# Patient Record
Sex: FEMALE | Race: OTHER | NOT HISPANIC OR LATINO | Employment: FULL TIME | URBAN - NONMETROPOLITAN AREA
[De-identification: names, ages, dates, MRNs, and addresses within clinical notes are randomized per-mention and may not be internally consistent; named-entity substitution may affect disease eponyms.]

---

## 2022-04-01 ENCOUNTER — OFFICE VISIT (OUTPATIENT)
Dept: PRIMARY CARE CLINIC | Facility: CLINIC | Age: 42
End: 2022-04-01

## 2022-04-01 ENCOUNTER — LAB VISIT (OUTPATIENT)
Dept: LAB | Facility: HOSPITAL | Age: 42
End: 2022-04-01
Attending: STUDENT IN AN ORGANIZED HEALTH CARE EDUCATION/TRAINING PROGRAM

## 2022-04-01 VITALS
BODY MASS INDEX: 22.98 KG/M2 | HEART RATE: 81 BPM | OXYGEN SATURATION: 99 % | HEIGHT: 60 IN | DIASTOLIC BLOOD PRESSURE: 56 MMHG | TEMPERATURE: 98 F | SYSTOLIC BLOOD PRESSURE: 117 MMHG | WEIGHT: 117.06 LBS

## 2022-04-01 DIAGNOSIS — R53.83 FATIGUE, UNSPECIFIED TYPE: ICD-10-CM

## 2022-04-01 DIAGNOSIS — Z13.6 ENCOUNTER FOR LIPID SCREENING FOR CARDIOVASCULAR DISEASE: ICD-10-CM

## 2022-04-01 DIAGNOSIS — R53.83 FATIGUE, UNSPECIFIED TYPE: Primary | ICD-10-CM

## 2022-04-01 DIAGNOSIS — R13.10 DYSPHAGIA, UNSPECIFIED TYPE: ICD-10-CM

## 2022-04-01 DIAGNOSIS — Z13.220 ENCOUNTER FOR LIPID SCREENING FOR CARDIOVASCULAR DISEASE: ICD-10-CM

## 2022-04-01 LAB
ALBUMIN SERPL BCP-MCNC: 2.9 G/DL (ref 3.5–5.2)
ALP SERPL-CCNC: 38 U/L (ref 55–135)
ALT SERPL W/O P-5'-P-CCNC: 14 U/L (ref 10–44)
ANION GAP SERPL CALC-SCNC: 4 MMOL/L (ref 8–16)
AST SERPL-CCNC: 10 U/L (ref 10–40)
BASOPHILS # BLD AUTO: 0.05 K/UL (ref 0–0.2)
BASOPHILS NFR BLD: 0.6 % (ref 0–1.9)
BILIRUB SERPL-MCNC: 0.1 MG/DL (ref 0.1–1)
BUN SERPL-MCNC: 12 MG/DL (ref 6–20)
CALCIUM SERPL-MCNC: 8.3 MG/DL (ref 8.7–10.5)
CHLORIDE SERPL-SCNC: 106 MMOL/L (ref 95–110)
CHOLEST SERPL-MCNC: 264 MG/DL (ref 120–199)
CHOLEST/HDLC SERPL: 2.9 {RATIO} (ref 2–5)
CO2 SERPL-SCNC: 27 MMOL/L (ref 23–29)
CREAT SERPL-MCNC: 0.6 MG/DL (ref 0.5–1.4)
DIFFERENTIAL METHOD: NORMAL
EOSINOPHIL # BLD AUTO: 0.2 K/UL (ref 0–0.5)
EOSINOPHIL NFR BLD: 2.9 % (ref 0–8)
ERYTHROCYTE [DISTWIDTH] IN BLOOD BY AUTOMATED COUNT: 12.7 % (ref 11.5–14.5)
EST. GFR  (AFRICAN AMERICAN): >60 ML/MIN/1.73 M^2
EST. GFR  (NON AFRICAN AMERICAN): >60 ML/MIN/1.73 M^2
GLUCOSE SERPL-MCNC: 102 MG/DL (ref 70–110)
HCT VFR BLD AUTO: 37.2 % (ref 37–48.5)
HDLC SERPL-MCNC: 92 MG/DL (ref 40–75)
HDLC SERPL: 34.8 % (ref 20–50)
HGB BLD-MCNC: 12.5 G/DL (ref 12–16)
IMM GRANULOCYTES # BLD AUTO: 0.02 K/UL (ref 0–0.04)
IMM GRANULOCYTES NFR BLD AUTO: 0.2 % (ref 0–0.5)
LDLC SERPL CALC-MCNC: 132.6 MG/DL (ref 63–159)
LYMPHOCYTES # BLD AUTO: 3.4 K/UL (ref 1–4.8)
LYMPHOCYTES NFR BLD: 41.8 % (ref 18–48)
MCH RBC QN AUTO: 28.6 PG (ref 27–31)
MCHC RBC AUTO-ENTMCNC: 33.6 G/DL (ref 32–36)
MCV RBC AUTO: 85 FL (ref 82–98)
MONOCYTES # BLD AUTO: 0.7 K/UL (ref 0.3–1)
MONOCYTES NFR BLD: 8.2 % (ref 4–15)
NEUTROPHILS # BLD AUTO: 3.7 K/UL (ref 1.8–7.7)
NEUTROPHILS NFR BLD: 46.3 % (ref 38–73)
NONHDLC SERPL-MCNC: 172 MG/DL
NRBC BLD-RTO: 0 /100 WBC
PLATELET # BLD AUTO: 280 K/UL (ref 150–450)
PMV BLD AUTO: 9.8 FL (ref 9.2–12.9)
POTASSIUM SERPL-SCNC: 4.1 MMOL/L (ref 3.5–5.1)
PROT SERPL-MCNC: 7.3 G/DL (ref 6–8.4)
RBC # BLD AUTO: 4.37 M/UL (ref 4–5.4)
SODIUM SERPL-SCNC: 137 MMOL/L (ref 136–145)
TRIGL SERPL-MCNC: 197 MG/DL (ref 30–150)
TSH SERPL DL<=0.005 MIU/L-ACNC: 2.35 UIU/ML (ref 0.4–4)
WBC # BLD AUTO: 8.07 K/UL (ref 3.9–12.7)

## 2022-04-01 PROCEDURE — 85025 COMPLETE CBC W/AUTO DIFF WBC: CPT | Performed by: STUDENT IN AN ORGANIZED HEALTH CARE EDUCATION/TRAINING PROGRAM

## 2022-04-01 PROCEDURE — 36415 COLL VENOUS BLD VENIPUNCTURE: CPT | Performed by: STUDENT IN AN ORGANIZED HEALTH CARE EDUCATION/TRAINING PROGRAM

## 2022-04-01 PROCEDURE — 99203 PR OFFICE/OUTPT VISIT, NEW, LEVL III, 30-44 MIN: ICD-10-PCS | Mod: S$PBB,,, | Performed by: STUDENT IN AN ORGANIZED HEALTH CARE EDUCATION/TRAINING PROGRAM

## 2022-04-01 PROCEDURE — 99203 OFFICE O/P NEW LOW 30 MIN: CPT | Mod: S$PBB,,, | Performed by: STUDENT IN AN ORGANIZED HEALTH CARE EDUCATION/TRAINING PROGRAM

## 2022-04-01 PROCEDURE — 99203 OFFICE O/P NEW LOW 30 MIN: CPT | Mod: PBBFAC,PN | Performed by: STUDENT IN AN ORGANIZED HEALTH CARE EDUCATION/TRAINING PROGRAM

## 2022-04-01 PROCEDURE — 84443 ASSAY THYROID STIM HORMONE: CPT | Performed by: STUDENT IN AN ORGANIZED HEALTH CARE EDUCATION/TRAINING PROGRAM

## 2022-04-01 PROCEDURE — 99999 PR PBB SHADOW E&M-NEW PATIENT-LVL III: ICD-10-PCS | Mod: PBBFAC,,, | Performed by: STUDENT IN AN ORGANIZED HEALTH CARE EDUCATION/TRAINING PROGRAM

## 2022-04-01 PROCEDURE — 80053 COMPREHEN METABOLIC PANEL: CPT | Performed by: STUDENT IN AN ORGANIZED HEALTH CARE EDUCATION/TRAINING PROGRAM

## 2022-04-01 PROCEDURE — 99999 PR PBB SHADOW E&M-NEW PATIENT-LVL III: CPT | Mod: PBBFAC,,, | Performed by: STUDENT IN AN ORGANIZED HEALTH CARE EDUCATION/TRAINING PROGRAM

## 2022-04-01 PROCEDURE — 80061 LIPID PANEL: CPT | Performed by: STUDENT IN AN ORGANIZED HEALTH CARE EDUCATION/TRAINING PROGRAM

## 2022-04-02 NOTE — PROGRESS NOTES
[facility] Note    Chief Complaint      Chief Complaint   Patient presents with    [facility]    Sore Throat     Patient is having trouble swallowing feels like something is stuck inside her throat.       History of Present Illness      Marisabel Jo is a 41 y.o. female who presents today for [facility] and Sore Throat (Patient is having trouble swallowing feels like something is stuck inside her throat.)    The sensation of food stuck started about two weeks ago. She is still able to swallow solids and liquids. Denies nausea, vomiting, weakness, chest pain, palpitation, shortness of breath, fever, chills, excessive headache, dizziness, appetite change, weight loss or weight gain.  Patient denies family medical history of CAD, stroke.  No neurological deficits or tenderness.    Past Medical History:  History reviewed. No pertinent past medical history.    Past Surgical History:   has no past surgical history on file.    Family History:  family history is not on file.     Social History:  Social History     Tobacco Use    Smoking status: Never Smoker    Smokeless tobacco: Never Used   Substance Use Topics    Alcohol use: Never    Drug use: Never     I personally reviewed all past medical, surgical, social and family history.    Review of Systems   Constitutional: Negative for chills, fever, malaise/fatigue and weight loss.   HENT: Negative for congestion, ear discharge, ear pain, sinus pain and sore throat.    Eyes: Negative for blurred vision, pain, discharge and redness.   Respiratory: Negative for cough, hemoptysis, sputum production, shortness of breath, wheezing and stridor.    Cardiovascular: Negative for chest pain, palpitations and leg swelling.   Gastrointestinal: Negative for abdominal pain, blood in stool, constipation, diarrhea, nausea and vomiting.   Genitourinary: Negative for dysuria, frequency and hematuria.   Musculoskeletal: Negative for back pain, falls, joint pain,  myalgias and neck pain.   Skin: Negative.  Negative for rash.   Neurological: Negative for dizziness, tingling, focal weakness, seizures, weakness and headaches.   Endo/Heme/Allergies: Negative for environmental allergies and polydipsia. Does not bruise/bleed easily.   Psychiatric/Behavioral: Negative for depression and suicidal ideas. The patient is not nervous/anxious.         Medications:  No outpatient encounter medications on file as of 4/1/2022.     No facility-administered encounter medications on file as of 4/1/2022.       Allergies:  Review of patient's allergies indicates:  No Known Allergies    Health Maintenance:    There is no immunization history on file for this patient.   Health Maintenance   Topic Date Due    Hepatitis C Screening  Never done    TETANUS VACCINE  Never done    Lipid Panel  Completed     The patient has no Health Maintenance topics of status Not Due  Health Maintenance Due   Topic Date Due    Hepatitis C Screening  Never done    Cervical Cancer Screening  Never done    COVID-19 Vaccine (1) Never done    HIV Screening  Never done    TETANUS VACCINE  Never done    Influenza Vaccine (1) Never done     Physical Exam      Vital Signs  Temp: 97.7 °F (36.5 °C)  Temp src: Oral  Pulse: 81  SpO2: 99 %  BP: (!) 117/56  BP Location: Left arm  Patient Position: Sitting  Height and Weight  Height: 5' (152.4 cm)  Weight: 53.1 kg (117 lb 1 oz)  BSA (Calculated - sq m): 1.5 sq meters  BMI (Calculated): 22.9  Weight in (lb) to have BMI = 25: 127.7]    Physical Exam  Vitals reviewed.   Constitutional:       General: She is not in acute distress.     Appearance: Normal appearance. She is normal weight. She is not ill-appearing or toxic-appearing.   HENT:      Head: Normocephalic and atraumatic.      Right Ear: External ear normal.      Left Ear: External ear normal.      Nose: Nose normal.      Mouth/Throat:      Mouth: Mucous membranes are moist.      Pharynx: Oropharynx is clear.   Eyes:       Extraocular Movements: Extraocular movements intact.      Conjunctiva/sclera: Conjunctivae normal.   Cardiovascular:      Rate and Rhythm: Normal rate and regular rhythm.      Pulses: Normal pulses.      Heart sounds: Normal heart sounds.   Pulmonary:      Effort: Pulmonary effort is normal. No respiratory distress.      Breath sounds: No stridor. No wheezing, rhonchi or rales.   Abdominal:      General: Abdomen is flat. Bowel sounds are normal. There is no distension.      Palpations: Abdomen is soft. There is no mass.      Tenderness: There is no abdominal tenderness. There is no right CVA tenderness, left CVA tenderness or guarding.   Musculoskeletal:         General: No tenderness. Normal range of motion.      Cervical back: Normal range of motion and neck supple. No rigidity or tenderness.      Right lower leg: No edema.      Left lower leg: No edema.   Skin:     General: Skin is warm and dry.      Capillary Refill: Capillary refill takes less than 2 seconds.   Neurological:      General: No focal deficit present.      Mental Status: She is alert and oriented to person, place, and time. Mental status is at baseline.      Motor: No weakness.      Gait: Gait normal.   Psychiatric:         Mood and Affect: Mood normal.         Behavior: Behavior normal.         Thought Content: Thought content normal.         Judgment: Judgment normal.        Assessment/Plan     Marisbael Jo is a 41 y.o.female with:    Problem List Items Addressed This Visit        Cardiac/Vascular    Encounter for lipid screening for cardiovascular disease    Relevant Orders    Lipid Panel (Completed)       GI    Dysphagia    Relevant Orders    X-Ray Neck Soft Tissue       Other    Fatigue - Primary    Relevant Orders    CBC Auto Differential (Completed)    Comprehensive Metabolic Panel (Completed)    TSH (Completed)        Other than changes above, cont current medications and maintain follow up with specialists.  No follow-ups on  file.    Future Appointments   Date Time Provider Department Center   4/8/2022  2:45 PM [facility]       Kazumi G Yoshinaga, DO [facility]

## 2022-04-08 ENCOUNTER — OFFICE VISIT (OUTPATIENT)
Dept: PRIMARY CARE CLINIC | Facility: CLINIC | Age: 42
End: 2022-04-08

## 2022-04-08 VITALS
OXYGEN SATURATION: 99 % | HEIGHT: 60 IN | HEART RATE: 79 BPM | TEMPERATURE: 99 F | DIASTOLIC BLOOD PRESSURE: 67 MMHG | BODY MASS INDEX: 23.16 KG/M2 | SYSTOLIC BLOOD PRESSURE: 122 MMHG | WEIGHT: 117.94 LBS

## 2022-04-08 DIAGNOSIS — R53.83 FATIGUE, UNSPECIFIED TYPE: ICD-10-CM

## 2022-04-08 DIAGNOSIS — R13.10 DYSPHAGIA, UNSPECIFIED TYPE: ICD-10-CM

## 2022-04-08 DIAGNOSIS — E78.2 MIXED HYPERLIPIDEMIA: Primary | ICD-10-CM

## 2022-04-08 PROCEDURE — 99999 PR PBB SHADOW E&M-EST. PATIENT-LVL III: CPT | Mod: PBBFAC,,, | Performed by: STUDENT IN AN ORGANIZED HEALTH CARE EDUCATION/TRAINING PROGRAM

## 2022-04-08 PROCEDURE — 99213 OFFICE O/P EST LOW 20 MIN: CPT | Mod: PBBFAC,PN | Performed by: STUDENT IN AN ORGANIZED HEALTH CARE EDUCATION/TRAINING PROGRAM

## 2022-04-08 PROCEDURE — 99999 PR PBB SHADOW E&M-EST. PATIENT-LVL III: ICD-10-PCS | Mod: PBBFAC,,, | Performed by: STUDENT IN AN ORGANIZED HEALTH CARE EDUCATION/TRAINING PROGRAM

## 2022-04-08 PROCEDURE — 99213 OFFICE O/P EST LOW 20 MIN: CPT | Mod: S$PBB,,, | Performed by: STUDENT IN AN ORGANIZED HEALTH CARE EDUCATION/TRAINING PROGRAM

## 2022-04-08 PROCEDURE — 99213 PR OFFICE/OUTPT VISIT, EST, LEVL III, 20-29 MIN: ICD-10-PCS | Mod: S$PBB,,, | Performed by: STUDENT IN AN ORGANIZED HEALTH CARE EDUCATION/TRAINING PROGRAM

## 2022-04-08 NOTE — PROGRESS NOTES
[facility] Note    Chief Complaint      Chief Complaint   Patient presents with    Follow-up     Labs. Patient would like to defer imagining studies as she would like to correct nutritional deficiencies and follow up in two months. The sensation of difficulty with passage of food still present, no worsening and passing PO solids and liquids.          History of Present Illness      Marisabel Jo is a 41 y.o. female who presents today for Follow-up (Labs. Patient would like to defer imagining studies as she would like to correct nutritional deficiencies and follow up in two months. The sensation of difficulty with passage of food still present, no worsening and passing PO solids and liquids. /)   .    Past Medical History:  History reviewed. No pertinent past medical history.    Past Surgical History:   has no past surgical history on file.    Family History:  family history is not on file.     Social History:  Social History     Tobacco Use    Smoking status: Never Smoker    Smokeless tobacco: Never Used   Substance Use Topics    Alcohol use: Never    Drug use: Never       I personally reviewed all past medical, surgical, social and family history.    Review of Systems   Constitutional: Negative for chills, fever, malaise/fatigue and weight loss.   HENT: Negative for congestion, ear discharge, ear pain, sinus pain and sore throat.    Eyes: Negative for blurred vision, pain, discharge and redness.   Respiratory: Negative for cough, hemoptysis, sputum production, shortness of breath, wheezing and stridor.    Cardiovascular: Negative for chest pain, palpitations and leg swelling.   Gastrointestinal: Negative for abdominal pain, blood in stool, constipation, diarrhea, nausea and vomiting.   Genitourinary: Negative for dysuria, frequency and hematuria.   Musculoskeletal: Negative for back pain, falls, joint pain, myalgias and neck pain.   Skin: Negative.  Negative for rash.   Neurological: Negative  for dizziness, tingling, focal weakness, seizures, weakness and headaches.   Endo/Heme/Allergies: Negative for environmental allergies and polydipsia. Does not bruise/bleed easily.   Psychiatric/Behavioral: Negative for depression and suicidal ideas. The patient is not nervous/anxious.         Medications:  No outpatient encounter medications on file as of 4/8/2022.     No facility-administered encounter medications on file as of 4/8/2022.     Allergies:  Review of patient's allergies indicates:  No Known Allergies    Health Maintenance:    There is no immunization history on file for this patient.   Health Maintenance   Topic Date Due    Hepatitis C Screening  Never done    TETANUS VACCINE  Never done    Lipid Panel  Completed        Physical Exam      Vital Signs  Temp: 98.5 °F (36.9 °C)  Temp src: Oral  Pulse: 79  SpO2: 99 %  BP: 122/67  BP Location: Right arm  Patient Position: Sitting  Height and Weight  Height: 5' (152.4 cm)  Weight: 53.5 kg (117 lb 15.1 oz)  BSA (Calculated - sq m): 1.5 sq meters  BMI (Calculated): 23  Weight in (lb) to have BMI = 25: 127.7]    Physical Exam  Vitals reviewed.   Constitutional:       General: She is not in acute distress.     Appearance: Normal appearance. She is normal weight. She is not ill-appearing or toxic-appearing.   HENT:      Head: Normocephalic and atraumatic.      Right Ear: External ear normal.      Left Ear: External ear normal.      Nose: Nose normal.      Mouth/Throat:      Mouth: Mucous membranes are moist.      Pharynx: Oropharynx is clear.   Eyes:      Extraocular Movements: Extraocular movements intact.      Conjunctiva/sclera: Conjunctivae normal.   Cardiovascular:      Rate and Rhythm: Normal rate and regular rhythm.      Pulses: Normal pulses.      Heart sounds: Normal heart sounds.   Pulmonary:      Effort: Pulmonary effort is normal. No respiratory distress.      Breath sounds: No stridor. No wheezing, rhonchi or rales.   Abdominal:      General:  Abdomen is flat. Bowel sounds are normal. There is no distension.      Palpations: Abdomen is soft. There is no mass.      Tenderness: There is no abdominal tenderness. There is no right CVA tenderness, left CVA tenderness or guarding.   Musculoskeletal:         General: No tenderness. Normal range of motion.      Cervical back: Normal range of motion and neck supple. No rigidity or tenderness.      Right lower leg: No edema.      Left lower leg: No edema.   Skin:     General: Skin is warm and dry.      Capillary Refill: Capillary refill takes less than 2 seconds.   Neurological:      General: No focal deficit present.      Mental Status: She is alert and oriented to person, place, and time. Mental status is at baseline.      Motor: No weakness.      Gait: Gait normal.   Psychiatric:         Mood and Affect: Mood normal.         Behavior: Behavior normal.         Thought Content: Thought content normal.         Judgment: Judgment normal.        Laboratory:  CBC:  Recent Labs   Lab 04/01/22  1717   WBC 8.07   RBC 4.37   Hemoglobin 12.5   Hematocrit 37.2   Platelets 280   MCV 85   MCH 28.6   MCHC 33.6     CMP:  Recent Labs   Lab 04/01/22  1717   Glucose 102   Calcium 8.3 L   Albumin 2.9 L   Total Protein 7.3   Sodium 137   Potassium 4.1   CO2 27   Chloride 106   BUN 12   Alkaline Phosphatase 38 L   ALT 14   AST 10   Total Bilirubin 0.1          LIPIDS:  Recent Labs   Lab 04/01/22  1717   TSH 2.350   HDL 92 H   Cholesterol 264 H   Triglycerides 197 H   LDL Cholesterol 132.6   HDL/Cholesterol Ratio 34.8   Non-HDL Cholesterol 172   Total Cholesterol/HDL Ratio 2.9     TSH:  Recent Labs   Lab 04/01/22  1717   TSH 2.350           Assessment/Plan     Marisabel Jo is a 41 y.o.female with:    Problem List Items Addressed This Visit        Cardiac/Vascular    Encounter for lipid screening for cardiovascular disease - Primary       GI    Dysphagia       Other    Fatigue          Other than changes above, cont current medications  and maintain follow up with specialists.  No follow-ups on file.    Future Appointments   Date Time Provider Department Center   6/8/2022  3:00 PM Lucas Alfaro DO [facility]       Kazumi G Yoshinaga, DO [facility]

## 2022-04-09 PROBLEM — E78.2 MIXED HYPERLIPIDEMIA: Status: ACTIVE | Noted: 2022-04-01

## 2022-04-10 NOTE — ASSESSMENT & PLAN NOTE
- stay physically active  - maintain daily adequate hydration 1.5 to 2L fluid volume  - as tolerated incorporate resistance training with stretching and cardio  - goal of 150 minutes per week of moderate intensity activity or 7,500 - 10,000 steps per day  - include whole fresh fruits, vegetables, olive oil, seeds, nuts, whole grains, cold water fish, salmon, mackerel and lean cuts of meat    - do not drink sugary/diet carbonated beverages  - avoid fast or fried and processed food, especially canned foods   - avoid refined carbohydrates, white starchy foods, flour, white potato, bread, muffins, and cakes  - follow a healthy diet that include enough calcium, vitamin D and proteins for bone health

## 2022-04-10 NOTE — ASSESSMENT & PLAN NOTE
Borderline anemia. Normal thyroid function. Reviewed current diet and counseled on increasing protein in diet and to take supplements in addition to improving nutritional intake. Patient does not like eating meat.  - start iron supplements, ie blood builders from [employer]  - counseled on food items to incorporate in daily meals   - wheat germ, dried fruits, nuts and seeds, whole grain and iron and vitamin fortified breads and cereals, dried beans, lentils, and split peas, leafy, dark-green vegetables, eggs  - f/u 6-8 weeks or sooner with worsening symptoms

## 2022-04-10 NOTE — ASSESSMENT & PLAN NOTE
Sensation of difficulty swallowing still present, patient states she will defer imaging study.    - f/u imagining, patient expresses will obtain xray on next visit.

## 2022-04-10 NOTE — ASSESSMENT & PLAN NOTE
"- Your LDL "bad cholesterol" is 133  - Your HDL "good cholesterol" is 92  - Your Triglycerides, natural fats and oils in blood is 197  - To help improve your cardiovascular health and reduce your risk of stroke or heart attack, the following healthy lifestyle changes are recommended.   - Choose whole food items, fresh/frozen fruits and vegetables and unprocessed meats.  - Avoid processed food items, cold cuts, canned foods in sugary and high sodium preservatives.   - Reduce intake of highly refined carbohydrates or white starchy foods, white bread, white flour pasta, sugary cereals, sugary drinks, sweet tea, soda including diet, candies, cakes and donuts.    - Reduce or avoid saturated fats (fats from animals and processed oils like palm oil, peanut oil, vegetable oil) and fried food items.   - Increase healthy fats like omega-3 fish oil, fatty fish (salmon, mackerel, sardine etc), olive oil, avocado, raw nuts etc.   - Increase fiber intake with daily goal of 6-8 servings of vegetables.  - Remember to maintain daily adequate hydration with water 1.5 to 2 liters fluid volume.    - Daily physical activities, start slow with 10-15 minutes of walk daily, then increase as tolerated to twice daily.   - Cardiovascular exercise also improves your cholesterol levels and your goal is low intensity (like walking and biking) 150 min/week to moderate/high intensity 75 min/week.    "

## 2022-06-08 ENCOUNTER — OFFICE VISIT (OUTPATIENT)
Dept: PRIMARY CARE CLINIC | Facility: CLINIC | Age: 42
End: 2022-06-08

## 2022-06-08 VITALS
DIASTOLIC BLOOD PRESSURE: 74 MMHG | HEIGHT: 60 IN | WEIGHT: 117.75 LBS | BODY MASS INDEX: 23.12 KG/M2 | SYSTOLIC BLOOD PRESSURE: 125 MMHG | TEMPERATURE: 98 F | HEART RATE: 80 BPM | OXYGEN SATURATION: 96 %

## 2022-06-08 DIAGNOSIS — R13.10 DYSPHAGIA, UNSPECIFIED TYPE: Primary | ICD-10-CM

## 2022-06-08 DIAGNOSIS — E78.2 MIXED HYPERLIPIDEMIA: ICD-10-CM

## 2022-06-08 DIAGNOSIS — K21.9 GASTROESOPHAGEAL REFLUX DISEASE WITHOUT ESOPHAGITIS: ICD-10-CM

## 2022-06-08 PROCEDURE — 99999 PR PBB SHADOW E&M-EST. PATIENT-LVL III: ICD-10-PCS | Mod: PBBFAC,,, | Performed by: STUDENT IN AN ORGANIZED HEALTH CARE EDUCATION/TRAINING PROGRAM

## 2022-06-08 PROCEDURE — 99213 OFFICE O/P EST LOW 20 MIN: CPT | Mod: S$PBB,,, | Performed by: STUDENT IN AN ORGANIZED HEALTH CARE EDUCATION/TRAINING PROGRAM

## 2022-06-08 PROCEDURE — 99999 PR PBB SHADOW E&M-EST. PATIENT-LVL III: CPT | Mod: PBBFAC,,, | Performed by: STUDENT IN AN ORGANIZED HEALTH CARE EDUCATION/TRAINING PROGRAM

## 2022-06-08 PROCEDURE — 99213 PR OFFICE/OUTPT VISIT, EST, LEVL III, 20-29 MIN: ICD-10-PCS | Mod: S$PBB,,, | Performed by: STUDENT IN AN ORGANIZED HEALTH CARE EDUCATION/TRAINING PROGRAM

## 2022-06-08 PROCEDURE — 99213 OFFICE O/P EST LOW 20 MIN: CPT | Mod: PBBFAC,PN | Performed by: STUDENT IN AN ORGANIZED HEALTH CARE EDUCATION/TRAINING PROGRAM

## 2022-06-08 RX ORDER — FAMOTIDINE 20 MG/1
20 TABLET, FILM COATED ORAL 2 TIMES DAILY
Qty: 60 TABLET | Refills: 1 | Status: SHIPPED | OUTPATIENT
Start: 2022-06-08 | End: 2022-07-08

## 2022-06-08 NOTE — PROGRESS NOTES
[facility] Note    Chief Complaint      Chief Complaint   Patient presents with    Follow-up     2 month follow up after diet modification     History of Present Illness      Marisabel Jo is a 42 y.o. female who presents today for Follow-up (2 month follow up after diet modification)    The sensation of having trouble swallowing has not been bothering her. She states no problems with passage of both solids and liquids.   Denies fever, chills, headaches, vision changes, excessive fatigue, nausea, vomiting, abdominal pain.   Has daily bowel movements of normal color, caliber stools.   Denies any blood in urine, stool or bleeding other than her regular menstrual cycles.     She has an appointment to meet with allergist in two weeks.   Patient agrees to trial of antiacid medications. Discussed at length for chronic acid reflux problems we will have to follow up with an evaluation with camera.     Other health maintenance discussion included:  - need for cervical cancer screen with PAP smear  - need for mammogram for breast cancer screening    Due to cost prohibitive condition, patient will explore options of having studies done in [facility].     Past Medical History:  History reviewed. No pertinent past medical history.    Past Surgical History:   has no past surgical history on file.    Family History:  family history is not on file.     Social History:  Social History     Tobacco Use    Smoking status: Never Smoker    Smokeless tobacco: Never Used   Substance Use Topics    Alcohol use: Never    Drug use: Never       I personally reviewed all past medical, surgical, social and family history.    Review of Systems   Constitutional: Negative for chills, fever, malaise/fatigue and weight loss.   HENT: Negative for congestion, ear discharge, ear pain, sinus pain and sore throat.    Eyes: Negative for blurred vision, pain, discharge and redness.   Respiratory: Negative for cough, hemoptysis, sputum  production, shortness of breath, wheezing and stridor.    Cardiovascular: Negative for chest pain, palpitations and leg swelling.   Gastrointestinal: Negative for abdominal pain, blood in stool, constipation, diarrhea, nausea and vomiting.   Genitourinary: Negative for dysuria, frequency and hematuria.   Musculoskeletal: Negative for back pain, falls, joint pain, myalgias and neck pain.   Skin: Negative.  Negative for rash.   Neurological: Negative for dizziness, tingling, focal weakness, seizures, weakness and headaches.   Endo/Heme/Allergies: Negative for environmental allergies and polydipsia. Does not bruise/bleed easily.   Psychiatric/Behavioral: Negative for depression and suicidal ideas. The patient is not nervous/anxious.         Medications:  Outpatient Encounter Medications as of 6/8/2022   Medication Sig Dispense Refill    famotidine (PEPCID) 20 MG tablet Take 1 tablet (20 mg total) by mouth 2 (two) times daily. 60 tablet 1     No facility-administered encounter medications on file as of 6/8/2022.     Allergies:  Review of patient's allergies indicates:  No Known Allergies    Health Maintenance:    There is no immunization history on file for this patient.   Health Maintenance   Topic Date Due    Hepatitis C Screening  Never done    TETANUS VACCINE  Never done    Mammogram  Never done    Lipid Panel  Completed        Physical Exam      Vital Signs  Temp: 98.2 °F (36.8 °C)  Temp src: Oral  Pulse: 80  SpO2: 96 %  BP: 125/74  BP Location: Left arm  Patient Position: Sitting  Pain Score: 0-No pain  Height and Weight  Height: 5' (152.4 cm)  Weight: 53.4 kg (117 lb 11.6 oz)  BSA (Calculated - sq m): 1.5 sq meters  BMI (Calculated): 23  Weight in (lb) to have BMI = 25: 127.7]    Physical Exam  Vitals reviewed.   Constitutional:       General: She is not in acute distress.     Appearance: Normal appearance. She is normal weight. She is not ill-appearing or toxic-appearing.   HENT:      Head: Normocephalic  and atraumatic.      Right Ear: External ear normal.      Left Ear: External ear normal.      Nose: Nose normal.      Mouth/Throat:      Mouth: Mucous membranes are moist.      Pharynx: Oropharynx is clear.   Eyes:      Extraocular Movements: Extraocular movements intact.      Conjunctiva/sclera: Conjunctivae normal.   Cardiovascular:      Rate and Rhythm: Normal rate and regular rhythm.      Pulses: Normal pulses.      Heart sounds: Normal heart sounds.   Pulmonary:      Effort: Pulmonary effort is normal. No respiratory distress.      Breath sounds: No stridor. No wheezing, rhonchi or rales.   Abdominal:      General: Abdomen is flat. Bowel sounds are normal. There is no distension.      Palpations: Abdomen is soft. There is no mass.      Tenderness: There is no abdominal tenderness. There is no right CVA tenderness, left CVA tenderness or guarding.   Musculoskeletal:         General: No tenderness. Normal range of motion.      Cervical back: Normal range of motion and neck supple. No rigidity or tenderness.      Right lower leg: No edema.      Left lower leg: No edema.   Skin:     General: Skin is warm and dry.      Capillary Refill: Capillary refill takes less than 2 seconds.   Neurological:      General: No focal deficit present.      Mental Status: She is alert and oriented to person, place, and time. Mental status is at baseline.      Motor: No weakness.      Gait: Gait normal.   Psychiatric:         Mood and Affect: Mood normal.         Behavior: Behavior normal.         Thought Content: Thought content normal.         Judgment: Judgment normal.        Laboratory:  CBC:  Recent Labs   Lab 04/01/22  1717   WBC 8.07   RBC 4.37   Hemoglobin 12.5   Hematocrit 37.2   Platelets 280   MCV 85   MCH 28.6   MCHC 33.6     CMP:  Recent Labs   Lab 04/01/22  1717   Glucose 102   Calcium 8.3 L   Albumin 2.9 L   Total Protein 7.3   Sodium 137   Potassium 4.1   CO2 27   Chloride 106   BUN 12   Alkaline Phosphatase 38 L   ALT  "14   AST 10   Total Bilirubin 0.1          LIPIDS:  Recent Labs   Lab 04/01/22  1717   TSH 2.350   HDL 92 H   Cholesterol 264 H   Triglycerides 197 H   LDL Cholesterol 132.6   HDL/Cholesterol Ratio 34.8   Non-HDL Cholesterol 172   Total Cholesterol/HDL Ratio 2.9     TSH:  Recent Labs   Lab 04/01/22  1717   TSH 2.350           Assessment/Plan     Marisabel Jo is a 42 y.o.female with:    Problem List Items Addressed This Visit        Cardiac/Vascular    Mixed hyperlipidemia    Current Assessment & Plan     - Your LDL "bad cholesterol" is 133  - Your HDL "good cholesterol" is 92  - Your Triglycerides, natural fats and oils in blood is 197  - To help improve your cardiovascular health and reduce your risk of stroke or heart attack, the following healthy lifestyle changes are recommended.   - Choose whole food items, fresh/frozen fruits and vegetables and unprocessed meats.  - Avoid processed food items, cold cuts, canned foods in sugary and high sodium preservatives.   - Reduce intake of highly refined carbohydrates or white starchy foods, white bread, white flour pasta, sugary cereals, sugary drinks, sweet tea, soda including diet, candies, cakes and donuts.    - Reduce or avoid saturated fats (fats from animals and processed oils like palm oil, peanut oil, vegetable oil) and fried food items.   - Increase healthy fats like omega-3 fish oil, fatty fish (salmon, mackerel, sardine etc), olive oil, avocado, raw nuts etc.   - Increase fiber intake with daily goal of 6-8 servings of vegetables.  - Remember to maintain daily adequate hydration with water 1.5 to 2 liters fluid volume.    - Daily physical activities, start slow with 10-15 minutes of walk daily, then increase as tolerated to twice daily.   - Cardiovascular exercise also improves your cholesterol levels and your goal is low intensity (like walking and biking) 150 min/week to moderate/high intensity 75 min/week.                Endocrine    BMI 23.0-23.9, adult    " Current Assessment & Plan     - stay physically active  - maintain daily adequate hydration 1.5 to 2L fluid volume  - as tolerated incorporate resistance training with stretching and cardio  - goal of 150 minutes per week of moderate intensity activity or 7,500 - 10,000 steps per day  - include whole fresh fruits, vegetables, olive oil, seeds, nuts, whole grains, cold water fish, salmon, mackerel and lean cuts of meat    - do not drink sugary/diet carbonated beverages  - avoid fast or fried and processed food, especially canned foods   - avoid refined carbohydrates, white starchy foods, flour, white potato, bread, muffins, and cakes  - follow a healthy diet that include enough calcium, vitamin D and proteins for bone health              GI    Dysphagia - Primary    Relevant Medications    famotidine (PEPCID) 20 MG tablet    Gastroesophageal reflux disease without esophagitis    Current Assessment & Plan     - start pepcid 20mg BID  - counseled on hydration, food choices and lying down after eating for 2-3 hours  - f/u 4 weeks or sooner with worsening or no improvement to symptoms             Relevant Medications    famotidine (PEPCID) 20 MG tablet        Other than changes above, cont current medications and maintain follow up with specialists.  No follow-ups on file.    No future appointments.    Kazumi G Yoshinaga, DO [facility]

## 2022-06-09 PROBLEM — K21.9 GASTROESOPHAGEAL REFLUX DISEASE WITHOUT ESOPHAGITIS: Status: ACTIVE | Noted: 2022-06-09

## 2022-06-09 NOTE — ASSESSMENT & PLAN NOTE
- start pepcid 20mg BID  - counseled on hydration, food choices and lying down after eating for 2-3 hours  - f/u 4 weeks or sooner with worsening or no improvement to symptoms

## 2022-06-14 LAB
CHOLEST SERPL-MSCNC: 278 MG/DL (ref 0–200)
HDLC SERPL-MCNC: 70 MG/DL
LDL CHOLESTEROL DIRECT: 173 MG/DL
TRIGL SERPL-MCNC: 190 MG/DL
VLDLC SERPL-MCNC: 35 MG/DL

## 2022-07-25 ENCOUNTER — PATIENT MESSAGE (OUTPATIENT)
Dept: ADMINISTRATIVE | Facility: HOSPITAL | Age: 42
End: 2022-07-25

## 2022-07-25 DIAGNOSIS — Z12.31 OTHER SCREENING MAMMOGRAM: ICD-10-CM

## 2022-10-04 ENCOUNTER — PATIENT MESSAGE (OUTPATIENT)
Dept: ADMINISTRATIVE | Facility: HOSPITAL | Age: 42
End: 2022-10-04

## 2023-01-09 ENCOUNTER — PATIENT MESSAGE (OUTPATIENT)
Dept: ADMINISTRATIVE | Facility: HOSPITAL | Age: 43
End: 2023-01-09

## 2023-01-17 ENCOUNTER — PATIENT OUTREACH (OUTPATIENT)
Dept: ADMINISTRATIVE | Facility: HOSPITAL | Age: 43
End: 2023-01-17

## 2023-03-24 LAB
HPV APTIMA: NEGATIVE
PAP RECOMMENDATION EXT: NORMAL

## 2023-04-03 ENCOUNTER — PATIENT MESSAGE (OUTPATIENT)
Dept: ADMINISTRATIVE | Facility: HOSPITAL | Age: 43
End: 2023-04-03

## 2023-04-04 ENCOUNTER — PATIENT OUTREACH (OUTPATIENT)
Dept: ADMINISTRATIVE | Facility: HOSPITAL | Age: 43
End: 2023-04-04

## 2023-07-10 ENCOUNTER — PATIENT MESSAGE (OUTPATIENT)
Dept: ADMINISTRATIVE | Facility: HOSPITAL | Age: 43
End: 2023-07-10

## 2024-10-16 DIAGNOSIS — R10.2 SUPRAPUBIC PAIN: Primary | ICD-10-CM

## 2024-10-16 DIAGNOSIS — D21.9 FIBROID: ICD-10-CM

## 2024-10-16 DIAGNOSIS — N94.6 DYSMENORRHEA: ICD-10-CM

## 2024-10-22 ENCOUNTER — HOSPITAL ENCOUNTER (EMERGENCY)
Facility: HOSPITAL | Age: 44
Discharge: HOME | End: 2024-10-22
Attending: EMERGENCY MEDICINE

## 2024-10-22 VITALS
HEART RATE: 79 BPM | OXYGEN SATURATION: 100 % | HEIGHT: 60 IN | TEMPERATURE: 97 F | SYSTOLIC BLOOD PRESSURE: 133 MMHG | DIASTOLIC BLOOD PRESSURE: 83 MMHG | WEIGHT: 104.06 LBS | BODY MASS INDEX: 20.43 KG/M2 | RESPIRATION RATE: 18 BRPM

## 2024-10-22 DIAGNOSIS — Z87.42 HISTORY OF HEAVY VAGINAL BLEEDING: Primary | ICD-10-CM

## 2024-10-22 DIAGNOSIS — D50.9 IRON DEFICIENCY ANEMIA, UNSPECIFIED IRON DEFICIENCY ANEMIA TYPE: ICD-10-CM

## 2024-10-22 LAB
ALBUMIN SERPL-MCNC: 3.2 G/DL (ref 3.5–5)
ALBUMIN/GLOB SERPL: 0.8 RATIO (ref 1.1–2)
ALP SERPL-CCNC: 45 UNIT/L (ref 40–150)
ALT SERPL-CCNC: 9 UNIT/L (ref 0–55)
ANION GAP SERPL CALC-SCNC: 8 MEQ/L
AST SERPL-CCNC: 12 UNIT/L (ref 5–34)
B-HCG UR QL: NEGATIVE
BASOPHILS # BLD AUTO: 0.09 X10(3)/MCL
BASOPHILS NFR BLD AUTO: 1.1 %
BILIRUB SERPL-MCNC: 0.2 MG/DL
BUN SERPL-MCNC: 10.2 MG/DL (ref 7–18.7)
CALCIUM SERPL-MCNC: 8.4 MG/DL (ref 8.4–10.2)
CHLORIDE SERPL-SCNC: 107 MMOL/L (ref 98–107)
CO2 SERPL-SCNC: 22 MMOL/L (ref 22–29)
CREAT SERPL-MCNC: 0.73 MG/DL (ref 0.55–1.02)
CREAT/UREA NIT SERPL: 14
CTP QC/QA: YES
EOSINOPHIL # BLD AUTO: 0.27 X10(3)/MCL (ref 0–0.9)
EOSINOPHIL NFR BLD AUTO: 3.2 %
ERYTHROCYTE [DISTWIDTH] IN BLOOD BY AUTOMATED COUNT: 14.6 % (ref 11.5–17)
GFR SERPLBLD CREATININE-BSD FMLA CKD-EPI: >60 ML/MIN/1.73/M2
GLOBULIN SER-MCNC: 4 GM/DL (ref 2.4–3.5)
GLUCOSE SERPL-MCNC: 90 MG/DL (ref 74–100)
HCT VFR BLD AUTO: 29.8 % (ref 37–47)
HGB BLD-MCNC: 9.1 G/DL (ref 12–16)
HOLD SPECIMEN: NORMAL
IMM GRANULOCYTES # BLD AUTO: 0.02 X10(3)/MCL (ref 0–0.04)
IMM GRANULOCYTES NFR BLD AUTO: 0.2 %
LYMPHOCYTES # BLD AUTO: 2.98 X10(3)/MCL (ref 0.6–4.6)
LYMPHOCYTES NFR BLD AUTO: 35.8 %
MCH RBC QN AUTO: 21.9 PG (ref 27–31)
MCHC RBC AUTO-ENTMCNC: 30.5 G/DL (ref 33–36)
MCV RBC AUTO: 71.6 FL (ref 80–94)
MONOCYTES # BLD AUTO: 0.52 X10(3)/MCL (ref 0.1–1.3)
MONOCYTES NFR BLD AUTO: 6.3 %
NEUTROPHILS # BLD AUTO: 4.44 X10(3)/MCL (ref 2.1–9.2)
NEUTROPHILS NFR BLD AUTO: 53.4 %
NRBC BLD AUTO-RTO: 0 %
PLATELET # BLD AUTO: 404 X10(3)/MCL (ref 130–400)
PMV BLD AUTO: 10.2 FL (ref 7.4–10.4)
POTASSIUM SERPL-SCNC: 3.4 MMOL/L (ref 3.5–5.1)
PROT SERPL-MCNC: 7.2 GM/DL (ref 6.4–8.3)
RBC # BLD AUTO: 4.16 X10(6)/MCL (ref 4.2–5.4)
SODIUM SERPL-SCNC: 137 MMOL/L (ref 136–145)
WBC # BLD AUTO: 8.32 X10(3)/MCL (ref 4.5–11.5)

## 2024-10-22 PROCEDURE — 80053 COMPREHEN METABOLIC PANEL: CPT | Performed by: PHYSICIAN ASSISTANT

## 2024-10-22 PROCEDURE — 99283 EMERGENCY DEPT VISIT LOW MDM: CPT

## 2024-10-22 PROCEDURE — 85025 COMPLETE CBC W/AUTO DIFF WBC: CPT | Performed by: PHYSICIAN ASSISTANT

## 2024-10-22 PROCEDURE — 81025 URINE PREGNANCY TEST: CPT | Performed by: PHYSICIAN ASSISTANT

## 2024-10-22 RX ORDER — ASCORBIC ACID 250 MG
250 TABLET ORAL DAILY
Qty: 30 TABLET | Refills: 0 | Status: SHIPPED | OUTPATIENT
Start: 2024-10-22 | End: 2024-11-21

## 2024-10-22 RX ORDER — FERROUS SULFATE 325(65) MG
325 TABLET, DELAYED RELEASE (ENTERIC COATED) ORAL DAILY
Qty: 30 TABLET | Refills: 0 | Status: SHIPPED | OUTPATIENT
Start: 2024-10-22 | End: 2024-11-21

## 2024-10-22 NOTE — ED PROVIDER NOTES
Encounter Date: 10/22/2024       History     Chief Complaint   Patient presents with    Vaginal Bleeding     VAG BLEEDING X 1 WK W LOWER BACK PAIN. WORSE SINCE THIS AM W SM CLOTS.  DENIES DIZZINESS/SOB.  VSS.      44-year-old female presents to the emergency department complaints of heavy menstrual cycle and lower back pain x1 week.  She states she was referred to gyn due to heavy vaginal bleeding with uterine fibroids however wanted evaluation because she feels she can not wait very long for treatment.  She denies dizziness, shortness of breath, syncope.    The history is provided by the patient. No  was used (Patient's friend is here for translation.).     Review of patient's allergies indicates:  No Known Allergies  History reviewed. No pertinent past medical history.  History reviewed. No pertinent surgical history.  No family history on file.  Social History     Tobacco Use    Smoking status: Never    Smokeless tobacco: Never   Substance Use Topics    Alcohol use: Never    Drug use: Never     Review of Systems   Constitutional:  Negative for chills and fever.   Respiratory:  Negative for shortness of breath.    Cardiovascular:  Negative for chest pain.   Gastrointestinal:  Negative for abdominal pain, nausea and vomiting.   Genitourinary:  Positive for vaginal bleeding. Negative for difficulty urinating and dysuria.   Musculoskeletal:  Positive for back pain (Lower).   Skin:  Negative for rash.   Neurological:  Negative for dizziness, weakness, light-headedness and headaches.       Physical Exam     Initial Vitals [10/22/24 1708]   BP Pulse Resp Temp SpO2   (!) 142/77 81 18 97.2 °F (36.2 °C) 100 %      MAP       --         Physical Exam    Nursing note and vitals reviewed.  Constitutional: She appears well-developed and well-nourished.   Cardiovascular:  Normal rate, regular rhythm, normal heart sounds and intact distal pulses.           Pulmonary/Chest: Breath sounds normal.   Abdominal:  Abdomen is soft. Bowel sounds are normal. There is no abdominal tenderness. There is no rebound and no guarding.   Musculoskeletal:         General: Normal range of motion.     Neurological: She is alert.   Skin: Skin is warm. Capillary refill takes less than 2 seconds.         ED Course   Procedures  Labs Reviewed   COMPREHENSIVE METABOLIC PANEL - Abnormal       Result Value    Sodium 137      Potassium 3.4 (*)     Chloride 107      CO2 22      Glucose 90      Blood Urea Nitrogen 10.2      Creatinine 0.73      Calcium 8.4      Protein Total 7.2      Albumin 3.2 (*)     Globulin 4.0 (*)     Albumin/Globulin Ratio 0.8 (*)     Bilirubin Total 0.2      ALP 45      ALT 9      AST 12      eGFR >60      Anion Gap 8.0      BUN/Creatinine Ratio 14     CBC WITH DIFFERENTIAL - Abnormal    WBC 8.32      RBC 4.16 (*)     Hgb 9.1 (*)     Hct 29.8 (*)     MCV 71.6 (*)     MCH 21.9 (*)     MCHC 30.5 (*)     RDW 14.6      Platelet 404 (*)     MPV 10.2      Neut % 53.4      Lymph % 35.8      Mono % 6.3      Eos % 3.2      Basophil % 1.1      Lymph # 2.98      Neut # 4.44      Mono # 0.52      Eos # 0.27      Baso # 0.09      IG# 0.02      IG% 0.2      NRBC% 0.0     CBC W/ AUTO DIFFERENTIAL    Narrative:     The following orders were created for panel order CBC Auto Differential.  Procedure                               Abnormality         Status                     ---------                               -----------         ------                     CBC with Differential[[id number]]       Abnormal            Final result                 Please view results for these tests on the individual orders.   EXTRA TUBES    Narrative:     The following orders were created for panel order EXTRA TUBES.  Procedure                               Abnormality         Status                     ---------                               -----------         ------                     Light Blue Top Hold[[id number]]                             Final  result               Light Green Top Hold[[id number]]                            Final result               Lavender Top Hold[[id number]]                               Final result               Gold Top Hold[[id number]]                                   Final result               Pink Top Hold[[id number]]                                   Final result                 Please view results for these tests on the individual orders.   LIGHT BLUE TOP HOLD    Extra Tube Hold for add-ons.     LIGHT GREEN TOP HOLD    Extra Tube Hold for add-ons.     LAVENDER TOP HOLD    Extra Tube Hold for add-ons.     GOLD TOP HOLD    Extra Tube Hold for add-ons.     PINK TOP HOLD    Extra Tube Hold for add-ons.     POCT URINE PREGNANCY    POC Preg Test, Ur Negative       Acceptable Yes            Imaging Results    None          Medications - No data to display  Medical Decision Making  44-year-old female presents to the emergency department complaints of heavy menstrual cycle and lower back pain x1 week.  She states she was referred to gyn due to heavy vaginal bleeding with uterine fibroids however wanted evaluation because she feels she can not wait very long for treatment.  She denies dizziness, shortness of breath, syncope.    DDx:  Abnormal uterine bleeding, heavy menstrual cycle, uterine fibroids, endometriosis, hormonal imbalance    Hemoglobin: 9.1, hematocrit 29.8.  Prescribed iron and vitamin-C to help prevent further anemia.  Sent urgent referral once again to gyn.  No concerns that would warrant calling out ultrasound at this time.    Amount and/or Complexity of Data Reviewed  Labs: ordered. Decision-making details documented in ED Course.    Risk  OTC drugs.               ED Course as of 10/23/24 0107   Tue Oct 22, 2024   1803 Hemoglobin(!): 9.1 [ER]   1803 Hematocrit(!): 29.8 [ER]   1847 The patient is resting comfortably and in no acute distress.  I personally discussed her test results and treatment plan.   Gave strict ED precautions, discussed specific conditions for return to the emergency department and importance of follow up with her primary care provider.  Patient voices understanding and agrees to the plan discussed. All patients' questions have been answered at this time.   She has remained hemodynamically stable throughout entire stay in ED and is stable for discharge home.  [ER]      ED Course User Index  [ER] Cristela Barth PA                           Clinical Impression:  Final diagnoses:  [Z87.42] History of heavy vaginal bleeding (Primary)  [D50.9] Iron deficiency anemia, unspecified iron deficiency anemia type          ED Disposition Condition    Discharge Stable          ED Prescriptions       Medication Sig Dispense Start Date End Date Auth. Provider    ferrous sulfate 325 (65 FE) MG EC tablet Take 1 tablet (325 mg total) by mouth once daily. Take Iron tab at the same time as the Vitamin C tab. 30 tablet 10/22/2024 11/21/2024 Cristela Barth PA    ascorbic acid, vitamin C, (VITAMIN C) 250 MG tablet Take 1 tablet (250 mg total) by mouth once daily. 30 tablet 10/22/2024 11/21/2024 Cristela Barth PA          Follow-up Information       Follow up With Specialties Details Why Contact Info    [facility]   [address] [address] [phone]    [facility] Emergency Medicine  As needed, If symptoms worsen [address] [address] [phone]    Lucas Alfaro, DO Family Medicine Schedule an appointment as soon as possible for a visit in 2 days  [address]   [address]  [facility] [address]  [phone]               Cristela Barth PA  10/23/24 0107

## 2024-10-22 NOTE — DISCHARGE INSTRUCTIONS
Take Vitamin C and Iron at the same time daily to help prevent further anemia.  Referral sent to GYN.  Call to schedule an appointment.  Return if symptoms worsen.     Spironolactone Pregnancy And Lactation Text: This medication can cause feminization of the male fetus and should be avoided during pregnancy. The active metabolite is also found in breast milk.

## 2024-10-22 NOTE — Clinical Note
"Marisabel Brown" Sandro was seen and treated in our emergency department on 10/22/2024.  She may return to work on 10/23/2024.       If you have any questions or concerns, please don't hesitate to call.      JULIET WELLER    "